# Patient Record
Sex: FEMALE | Race: WHITE | NOT HISPANIC OR LATINO
[De-identification: names, ages, dates, MRNs, and addresses within clinical notes are randomized per-mention and may not be internally consistent; named-entity substitution may affect disease eponyms.]

---

## 2017-03-23 ENCOUNTER — APPOINTMENT (OUTPATIENT)
Dept: PAIN MANAGEMENT | Facility: CLINIC | Age: 27
End: 2017-03-23

## 2021-07-05 ENCOUNTER — EMERGENCY (EMERGENCY)
Facility: HOSPITAL | Age: 31
LOS: 1 days | Discharge: HOME | End: 2021-07-05
Attending: STUDENT IN AN ORGANIZED HEALTH CARE EDUCATION/TRAINING PROGRAM | Admitting: STUDENT IN AN ORGANIZED HEALTH CARE EDUCATION/TRAINING PROGRAM
Payer: COMMERCIAL

## 2021-07-05 VITALS
HEIGHT: 63 IN | RESPIRATION RATE: 17 BRPM | DIASTOLIC BLOOD PRESSURE: 94 MMHG | OXYGEN SATURATION: 98 % | TEMPERATURE: 98 F | WEIGHT: 139.99 LBS | HEART RATE: 88 BPM | SYSTOLIC BLOOD PRESSURE: 128 MMHG

## 2021-07-05 VITALS
OXYGEN SATURATION: 99 % | TEMPERATURE: 98 F | SYSTOLIC BLOOD PRESSURE: 134 MMHG | DIASTOLIC BLOOD PRESSURE: 89 MMHG | RESPIRATION RATE: 18 BRPM | HEART RATE: 83 BPM

## 2021-07-05 PROCEDURE — 90715 TDAP VACCINE 7 YRS/> IM: CPT

## 2021-07-05 PROCEDURE — 99284 EMERGENCY DEPT VISIT MOD MDM: CPT | Mod: 25

## 2021-07-05 PROCEDURE — 90471 IMMUNIZATION ADMIN: CPT

## 2021-07-05 PROCEDURE — 16020 DRESS/DEBRID P-THICK BURN S: CPT

## 2021-07-05 RX ORDER — BACITRACIN ZINC 500 UNIT/G
1 OINTMENT IN PACKET (EA) TOPICAL ONCE
Refills: 0 | Status: COMPLETED | OUTPATIENT
Start: 2021-07-05 | End: 2021-07-05

## 2021-07-05 RX ORDER — TETANUS TOXOID, REDUCED DIPHTHERIA TOXOID AND ACELLULAR PERTUSSIS VACCINE, ADSORBED 5; 2.5; 8; 8; 2.5 [IU]/.5ML; [IU]/.5ML; UG/.5ML; UG/.5ML; UG/.5ML
0.5 SUSPENSION INTRAMUSCULAR ONCE
Refills: 0 | Status: COMPLETED | OUTPATIENT
Start: 2021-07-05 | End: 2021-07-05

## 2021-07-05 RX ADMIN — Medication 1 APPLICATION(S): at 23:08

## 2021-07-05 RX ADMIN — TETANUS TOXOID, REDUCED DIPHTHERIA TOXOID AND ACELLULAR PERTUSSIS VACCINE, ADSORBED 0.5 MILLILITER(S): 5; 2.5; 8; 8; 2.5 SUSPENSION INTRAMUSCULAR at 23:08
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
## 2021-07-05 NOTE — ED PROVIDER NOTE - NSFOLLOWUPINSTRUCTIONS_ED_ALL_ED_FT
1. TAKE ALL MEDICATIONS AS DIRECTED.    2. FOR PAIN OR FEVER YOU CAN TAKE IBUPROFEN (MOTRIN, ADVIL) OR ACETAMINOPHEN (TYLENOL) AS NEEDED, AS DIRECTED ON PACKAGING.  3. FOLLOW UP WITH YOUR PRIMARY DOCTOR WITHIN 5 DAYS AS DIRECTED.  4. IF YOU HAD LABS OR IMAGING DONE, YOU WERE GIVEN COPIES OF ALL LABS AND/OR IMAGING RESULTS FROM YOUR ER VISIT--PLEASE TAKE THEM WITH YOU TO YOUR FOLLOW UP APPOINTMENTS.  5. IF NEEDED, CALL PATIENT ACCESS SERVICES AT [phone] TO FIND A PRIMARY CARE PHYSICIAN.  OR CALL [phone] TO MAKE AN APPOINTMENT WITH THE CLINIC.  6. RETURN TO THE ER FOR ANY WORSENING SYMPTOMS OR CONCERNS.    apply BACITRACIN TO WOUNDS AS DIRECTED    SECOND-DEGREE BURN - Discharge Care           Second-Degree Burn    WHAT YOU NEED TO KNOW:    A second-degree burn is also called a partial-thickness burn. A second-degree burn occurs when the first layer and some of the second layer of skin are burned. A superficial second-degree burn usually heals within 2 to 3 weeks with some scarring. A deep second-degree burn can take longer to heal. A second-degree burn can also get worse after a few days and become a third-degree burn.    DISCHARGE INSTRUCTIONS:    Seek care immediately if:   •You have a fast heartbeat or breathing.      •You are not urinating.      Call your doctor or burn specialist if:   •You have a fever.      •You have increased redness, numbness, or swelling in the burn area.      •Your wound or bandage is leaking pus and has a bad smell.      •Your pain does not get better, or gets worse, even after you take pain medicine.      •You have a dry mouth or eyes.      •You are overly thirsty or tired.      •You have dark yellow urine or urinate less than usual.      •You have a headache or feel dizzy.      •You have questions or concerns about your condition or care.      Medicines:   •Medicines may be given to decrease pain, prevent infection, or help your burn heal. They may be given as a pill or as an ointment applied to your skin.      •Take your medicine as directed. Contact your healthcare provider if you think your medicine is not helping or if you have side effects. Tell him or her if you are allergic to any medicine. Keep a list of the medicines, vitamins, and herbs you take. Include the amounts, and when and why you take them. Bring the list or the pill bottles to follow-up visits. Carry your medicine list with you in case of an emergency.      Burn care:   •Wash your hands with soap and water. Dry your hands with a clean towel or paper towel.  Handwashing           •Remove old bandages. You may need to soak the bandage in water before you remove it so it will not stick to your wound.      •Gently clean the burned area daily with mild soap and water. Pat the area dry. Look for any swelling or redness around the burn. Do not break closed blisters. You may cause a skin infection.      •Apply cream or ointment to the burn with a cotton swab. Place a nonstick bandage over your burn.      •Wrap a layer of gauze around the bandage to hold it in place. The wrap should be snug but not tight. It is too tight if you feel tingling or lose feeling in that area.      •Apply gentle pressure for a few minutesif bleeding occurs.      •Elevate your burned arm or leg above the level of your heart as often as you can. This will help decrease swelling and pain. Prop your burned arm or leg on pillows or blankets to keep it elevated comfortably.             Self-care:   •Drink liquids as directed. You may need to drink extra liquid to help prevent dehydration. Ask how much liquid to drink each day and which liquids are best for you.      •Go to physical therapy, if directed. Your muscles and joints may not work well after a second-degree burn. A physical therapist teaches you exercises to help improve movement and strength, and to decrease pain.      Prevent second-degree burns:   •Do not leave cups, mugs, or bowls containing hot liquids at the edge of a table. Keep pot handles turned away from the stove front.      •Do not leave a lit cigarette. Make sure it is no longer lit. Then dispose of it safely.      •Store dangerous items out of the reach of children. Store cigarette lighters, matches, and chemicals where children cannot reach them. Use child safety latches on the door of the safe storage area.  Common Childproofing Latches            •Keep your water heater setting to low or medium (90°F to 120°F, or 32°C to 48°C).      •Wear sunscreen that has a sun protectant factor (SPF) of 15 or higher. The sunscreen should also have ultraviolet A (UVA) and ultraviolet B (UVB) protection. Follow the directions on the label when you use sunscreen. Put on more sunscreen if you are in the sun for more than an hour. Reapply sunscreen often if you go swimming or are sweating.      Follow up with your doctor or burn specialist as directed: You may need to return to have your wound checked and your bandage changed. Write down your questions so you remember to ask them during your visits.
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
## 2021-07-05 NOTE — ED ADULT NURSE NOTE - NSIMPLEMENTINTERV_GEN_ALL_ED
Implemented All Universal Safety Interventions:  [place] to call system. Call bell, personal items and telephone within reach. Instruct patient to call for assistance. Room bathroom lighting operational. Non-slip footwear when patient is off stretcher. Physically safe environment: no spills, clutter or unnecessary equipment. Stretcher in lowest position, wheels locked, appropriate side rails in place.

## 2021-07-05 NOTE — ED PROVIDER NOTE - PATIENT PORTAL LINK FT
You can access the FollowMyHealth Patient Portal offered by [facility] by registering at the following website: [email or web address]. By joining [facility]’s FollowMyHealth portal, you will also be able to view your health information using other applications (apps) compatible with our system.
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
## 2022-07-11 ENCOUNTER — NON-APPOINTMENT (OUTPATIENT)
Age: 32
End: 2022-07-11

## 2024-02-07 PROBLEM — Z78.9 OTHER SPECIFIED HEALTH STATUS: Chronic | Status: ACTIVE | Noted: 2021-07-05

## 2024-05-06 ENCOUNTER — NON-APPOINTMENT (OUTPATIENT)
Age: 34
End: 2024-05-06

## 2024-05-06 ENCOUNTER — APPOINTMENT (OUTPATIENT)
Dept: INTERNAL MEDICINE | Facility: CLINIC | Age: 34
End: 2024-05-06
Payer: COMMERCIAL

## 2024-05-06 VITALS
DIASTOLIC BLOOD PRESSURE: 83 MMHG | WEIGHT: 170 LBS | HEIGHT: 63 IN | BODY MASS INDEX: 30.12 KG/M2 | RESPIRATION RATE: 12 BRPM | SYSTOLIC BLOOD PRESSURE: 116 MMHG | HEART RATE: 77 BPM | OXYGEN SATURATION: 97 %

## 2024-05-06 DIAGNOSIS — Z78.9 OTHER SPECIFIED HEALTH STATUS: ICD-10-CM

## 2024-05-06 DIAGNOSIS — Z82.5 FAMILY HISTORY OF ASTHMA AND OTHER CHRONIC LOWER RESPIRATORY DISEASES: ICD-10-CM

## 2024-05-06 DIAGNOSIS — Z80.42 FAMILY HISTORY OF MALIGNANT NEOPLASM OF PROSTATE: ICD-10-CM

## 2024-05-06 DIAGNOSIS — Z80.3 FAMILY HISTORY OF MALIGNANT NEOPLASM OF BREAST: ICD-10-CM

## 2024-05-06 DIAGNOSIS — J45.990 EXERCISE INDUCED BRONCHOSPASM: ICD-10-CM

## 2024-05-06 DIAGNOSIS — Z82.49 FAMILY HISTORY OF ISCHEMIC HEART DISEASE AND OTHER DISEASES OF THE CIRCULATORY SYSTEM: ICD-10-CM

## 2024-05-06 DIAGNOSIS — Z00.00 ENCOUNTER FOR GENERAL ADULT MEDICAL EXAMINATION W/OUT ABNORMAL FINDINGS: ICD-10-CM

## 2024-05-06 DIAGNOSIS — F32.81 PREMENSTRUAL DYSPHORIC DISORDER: ICD-10-CM

## 2024-05-06 PROCEDURE — 93000 ELECTROCARDIOGRAM COMPLETE: CPT

## 2024-05-06 PROCEDURE — 99385 PREV VISIT NEW AGE 18-39: CPT

## 2024-05-06 RX ORDER — ALBUTEROL 90 MCG
90 AEROSOL (GRAM) INHALATION EVERY 6 HOURS
Refills: 0 | Status: ACTIVE | COMMUNITY

## 2024-05-06 RX ORDER — SERTRALINE HYDROCHLORIDE 50 MG/1
50 TABLET, FILM COATED ORAL DAILY
Refills: 0 | Status: ACTIVE | COMMUNITY

## 2024-05-06 NOTE — HISTORY OF PRESENT ILLNESS
[FreeTextEntry1] : CPE [de-identified] : Ana Maria is here today for CPE and new patient visit.  Has a medical history of PMDD which has been slightly treated with Zoloft 50 mg daily.  She also has a history of exercise-induced asthma since adolescence.  She treats this with as needed albuterol.  She otherwise has no acute concerns.  Did notice some mild weight gain after stopping her birth control and starting Zoloft.

## 2024-05-06 NOTE — PHYSICAL EXAM
[No Acute Distress] : no acute distress [Well Nourished] : well nourished [Well Developed] : well developed [Well-Appearing] : well-appearing [Normal Sclera/Conjunctiva] : normal sclera/conjunctiva [No Lymphadenopathy] : no lymphadenopathy [Thyroid Normal, No Nodules] : the thyroid was normal and there were no nodules present [No Respiratory Distress] : no respiratory distress  [No Accessory Muscle Use] : no accessory muscle use [Clear to Auscultation] : lungs were clear to auscultation bilaterally [Normal Rate] : normal rate  [Regular Rhythm] : with a regular rhythm [Normal S1, S2] : normal S1 and S2 [No Murmur] : no murmur heard [No Carotid Bruits] : no carotid bruits [No Abdominal Bruit] : a ~M bruit was not heard ~T in the abdomen [No Edema] : there was no peripheral edema [Declined Breast Exam] : declined breast exam  [No Axillary Lymphadenopathy] : no axillary lymphadenopathy [Soft] : abdomen soft [Non Tender] : non-tender [Non-distended] : non-distended [Normal Bowel Sounds] : normal bowel sounds [Normal Supraclavicular Nodes] : no supraclavicular lymphadenopathy [Normal Axillary Nodes] : no axillary lymphadenopathy [Normal Posterior Cervical Nodes] : no posterior cervical lymphadenopathy [Normal Anterior Cervical Nodes] : no anterior cervical lymphadenopathy [No Spinal Tenderness] : no spinal tenderness [Normal Gait] : normal gait [Alert and Oriented x3] : oriented to person, place, and time [de-identified] : Chest done by OB/GYN.

## 2024-05-06 NOTE — HEALTH RISK ASSESSMENT
[Yes] : Yes [de-identified] : Very rarely. [Patient reported PAP Smear was normal] : Patient reported PAP Smear was normal [Never] : Never

## 2024-05-06 NOTE — ASSESSMENT
[FreeTextEntry1] : CPE CBC, CMP, A1c, lipid, UA EKG is sinus rhythm Up-to-date with OB/GYN  1.  PMDD Continue sertraline 50 mg daily  2.  Exercise-induced asthma Albuterol as needed  Follow-up yearly or as needed for acute concerns